# Patient Record
Sex: MALE | Race: WHITE | NOT HISPANIC OR LATINO
[De-identification: names, ages, dates, MRNs, and addresses within clinical notes are randomized per-mention and may not be internally consistent; named-entity substitution may affect disease eponyms.]

---

## 2022-04-20 PROBLEM — Z00.00 ENCOUNTER FOR PREVENTIVE HEALTH EXAMINATION: Noted: 2022-04-20

## 2022-04-22 PROBLEM — Z00.00 ENCOUNTER FOR PREVENTIVE HEALTH EXAMINATION: Status: ACTIVE | Noted: 2022-04-22

## 2022-05-03 ENCOUNTER — APPOINTMENT (OUTPATIENT)
Dept: ORTHOPEDIC SURGERY | Facility: CLINIC | Age: 62
End: 2022-05-03

## 2022-05-19 ENCOUNTER — APPOINTMENT (OUTPATIENT)
Dept: ORTHOPEDIC SURGERY | Facility: CLINIC | Age: 62
End: 2022-05-19
Payer: OTHER MISCELLANEOUS

## 2022-05-19 VITALS — BODY MASS INDEX: 24.38 KG/M2 | WEIGHT: 180 LBS | HEIGHT: 72 IN

## 2022-05-19 DIAGNOSIS — F17.200 NICOTINE DEPENDENCE, UNSPECIFIED, UNCOMPLICATED: ICD-10-CM

## 2022-05-19 DIAGNOSIS — Z78.9 OTHER SPECIFIED HEALTH STATUS: ICD-10-CM

## 2022-05-19 PROCEDURE — 20610 DRAIN/INJ JOINT/BURSA W/O US: CPT

## 2022-05-19 PROCEDURE — 99214 OFFICE O/P EST MOD 30 MIN: CPT | Mod: 25

## 2022-05-19 PROCEDURE — 99072 ADDL SUPL MATRL&STAF TM PHE: CPT

## 2022-05-19 RX ORDER — LOSARTAN POTASSIUM 50 MG/1
50 TABLET, FILM COATED ORAL
Refills: 0 | Status: ACTIVE | COMMUNITY

## 2022-05-19 RX ORDER — ROSUVASTATIN CALCIUM 40 MG/1
40 TABLET, FILM COATED ORAL
Refills: 0 | Status: ACTIVE | COMMUNITY

## 2022-05-19 RX ORDER — PANTOPRAZOLE SODIUM 40 MG/1
40 GRANULE, DELAYED RELEASE ORAL
Refills: 0 | Status: ACTIVE | COMMUNITY

## 2022-05-19 NOTE — HISTORY OF PRESENT ILLNESS
[8] : 8 [6] : 6 [de-identified] : discussed pain knee returned after orthovisc but still some pain medially . is working no falls new injureis.  [FreeTextEntry1] : left knee [de-identified] : none

## 2022-05-19 NOTE — PHYSICAL EXAM
[Left] : left knee [NL (0)] : extension 0 degrees [5___] : hamstring 5[unfilled]/5 [Equivocal] : equivocal Davonte [] : negative Pivot Shift [TWNoteComboBox7] : flexion 125 degrees

## 2022-05-19 NOTE — PROCEDURE
[FreeTextEntry3] : Large Joint Injection was performed because of pain and inflammation. Anesthesia: ethyl chloride sprayed topically.. \par Decadron: An injection of Decadron 4 mg , \par Kenalog: An injection of Kenalog 5 mg , \par Lidocaine: 2 cc. \par \par Medication was injected in the left knee. Patient has tried OTC's including aspirin, Ibuprofen, Aleve etc or prescription NSAIDS, and/or exercises at home and/ or physical therapy without satisfactory response and Patient has decreased mobility in the joint. After verbal consent using sterile preparation and technique. The risks, benefits, and alternatives to cortisone injection were explained in full to the patient. Risks outlined include but are not limited to infection, sepsis, bleeding, scarring, skin discoloration, temporary increase in pain, syncopal episode, failure to resolve symptoms, allergic reaction, symptom recurrence, and elevation of blood sugar in diabetics. Patient understood the risks. All questions were answered. After discussion of options, patient requested an injection. Oral informed consent was obtained and sterile prep was done of the injection site. Sterile technique was utilized for the procedure including the preparation of the solutions used for the injection. Patient tolerated the procedure well. Advised to ice the injection site this evening. Prep with betadine locally to site. Sterile technique used and Prep with alcohol locally to site. Sterile technique used. Patient tolerated procedure well. Post Procedure Instructions: Patient was advised to call if redness, pain, or fever occur and apply ice for 15 min. out of every hour for the next 12-24 hours as tolerated. patient was advised to rest the joint(s) for 1-2 days.\par

## 2022-06-28 ENCOUNTER — APPOINTMENT (OUTPATIENT)
Dept: PAIN MANAGEMENT | Facility: CLINIC | Age: 62
End: 2022-06-28

## 2022-06-28 PROCEDURE — 64483 NJX AA&/STRD TFRM EPI L/S 1: CPT | Mod: LT

## 2022-06-28 PROCEDURE — 99072 ADDL SUPL MATRL&STAF TM PHE: CPT

## 2022-06-28 PROCEDURE — 64484 NJX AA&/STRD TFRM EPI L/S EA: CPT | Mod: 59,LT

## 2022-06-28 NOTE — PROCEDURE
[FreeTextEntry3] : Date of Service: 06/28/2022 \par \par Account: [id number]\par \par Patient: Haresh Quezada \par \par YOB: 1960\par \par Age: 62 year\par \par \par Surgeon: Sharlene Rosen M.D.\par \par Assistant: None.\par \par Pre-Operative Diagnosis: Lumbosacral Radiculitis (M54.17)\par \par Post Operative Diagnosis: Lumbosacral Radiculitis (M54.17)\par \par Procedure: Left L4-5, L5-S1 transforaminal epidural steroid injection under fluoroscopic guidance.        \par \par \par This procedure was carried out using fluoroscopic guidance.  The risks and benefits of the procedure were discussed extensively with the patient.  The consent of the patient was obtained and the following procedure was performed. The patient was placed in the prone position on the fluoroscopic table and the lumbar area was prepped and draped in a sterile fashion.\par \par The left L4-5 and L5-S1 neural foramen were identified on left oblique "elan dog" anatomical view at the 6 o' clock position using fluoroscopic guidance, and the area was marked. The overlying skin and subcutaneous structures were anesthetized using sterile technique with 1% Lidocaine.  A 22 gauge spinal needle was directed toward the inferior (6 o'clock) position of the pedicle, which formed the roof of the identified foramens.  Once in the epidural space, after negative aspiration for heme and CSF, 1cc of Omnipaque contrast was injected to confirm epidural location and assess filling defects and rule out intravascular needle placement. \par \par The following contrast flow and epidurogram was observed: no intravascular or intrathecal flow pattern was noted.  No blood or CSF was aspirated. Omnipaque spread appeared to outline the left L4 and L5 nerve roots and spread medially into the epidural space.  \par \par After this, an injectate of 3 cc preservative free normal saline plus 40 mg of Kenalog was injected in the epidural space at each of the two levels.\par \par The needle was subsequently removed.  Vital signs remained normal.  Pulse oximeter was used throughout the procedure and the patient's pulse and oxygen saturation remained within normal limits.  The patient tolerated the procedure well.  There were no complications.  The patient was instructed to apply ice over the injection sites for twenty minutes every two hours for the next 24 to 48 hours.  The patient was also instructed to contact me immediately if there were any problems.\par \desiree Rosen M.D. \par

## 2022-07-22 ENCOUNTER — APPOINTMENT (OUTPATIENT)
Dept: PAIN MANAGEMENT | Facility: CLINIC | Age: 62
End: 2022-07-22

## 2022-07-22 VITALS — BODY MASS INDEX: 24.38 KG/M2 | HEIGHT: 72 IN | WEIGHT: 180 LBS

## 2022-07-22 DIAGNOSIS — M25.562 PAIN IN LEFT KNEE: ICD-10-CM

## 2022-07-22 PROCEDURE — 99214 OFFICE O/P EST MOD 30 MIN: CPT

## 2022-07-22 PROCEDURE — 99072 ADDL SUPL MATRL&STAF TM PHE: CPT

## 2022-07-22 NOTE — HISTORY OF PRESENT ILLNESS
[Lower back] : lower back [9] : 9 [5] : 5 [Dull/Aching] : dull/aching [Stabbing] : stabbing [] : yes [Intermittent] : intermittent [Household chores] : household chores [Leisure] : leisure [Sleep] : sleep [Social interactions] : social interactions [Ice] : ice [Heat] : heat [Massage] : massage [FreeTextEntry1] : 07/22/2022: follow up today.  Had 40% relief from TFESI.  Still has some pain left hip and into left knee.  Had surgery last May. Pt with what sounds like re irritation of the left L2/3 nerve roots. Had surgery on 5/5/21, no updated imaging after surgery. Pain in the left leg to radiation to the left thigh. with certain movements pain is severe. Pain in the anterior thigh and left calf. +numbness in the left leg. Has NEVER had any acupucnture. \par \par 3/22/22: follow up today. He is seeing Dr. Pate for the left knee pain. (having gel injections) Having pain that\par radiates down the left leg. pain in the left lower back. Has tightness in the left knee. Not interested in restarting\par gabapentin. EMG/NCV: revealed left L5 radiculopathy\par \par 11/9/21- Patient had laminectomy discectomy L2-L3 with Dr. Knight on 5/6/21. Still is doing PT. Pain and stiffness in the left knee, numbness in the left shin. He is not interested in re starting gabapentin. Had EMG/NCV this year. he will fax over the results. working light duty as an .\par \par 3/1/21- Patient reports since last visit his pain has gotten much worse. His pain is now in the entire left leg. This does correlate with his MRI which does reveal nerve impingement of the L3, L4, L5 and S1 nerve roots. I would recommend trial of Pamelor at night. He had about 24 visits over the last year which had improved his ROM and strength. He has not had physical therapy since December and has now had increased pain, now his whole leg. +numbness in the left leg. +weakness. I would recommend he return back to PT to increase ROM, ADLS and decrease pain. He had improvement prior to include improvement of his ROM and pain.\par \par 1/23/21- Patient presents for initial evaluation. He reports increased pain since an injury at work on 1/24/20 while\par working as an . Pain is in the left buttock with radiation down the left leg to the left calf. Intermittent n/t. he reports the legs feel tired. He has been going to physical therapy with improvement of his pain. He is currently resuming PT. Majority left LE pain. (80%) having some increased knee pain.\par \par Subjective weakness: Yes\par Lower extremity paresthesias: Yes\par Bladder/bowel dysfunction:No\par Attempted modalities for current complaint:\par See above:\par Medications: No\par Injections: No\par Previous Spine Surgery: N/A\par Imaging:\par MRI Lumbar Spine (06/02/20) - MRI L spine - 1. Mild dextrolevoscoliosis, diffuse multilevel degenerative disc disease and multiple disc herniations with impingement upon the left L3 nerve root at L2-L3, mild central stenosis and bilateral exiting L3 nerve root encroachment at L3-L4, mild central stenosis with right L5 nerve root impingement as well as right greater than left exiting L4 nerve root impingement at L4-L5 and impingement upon bilateral S1 nerve roots and bilateral exiting L5 nerve roots at L5-S1.\par 2. Scattered degenerative endplate changes particularly in the lower lumbar spine on the right.\par 3. No acute fracture. [FreeTextEntry7] : left hip, left side to the front of the leg and down the side  [FreeTextEntry9] : Tens Unit  [de-identified] : Turning the wrong way, certain positions, squatting and kneeling

## 2022-07-22 NOTE — ASSESSMENT
[FreeTextEntry1] : After discussing various treatment options with the patient including but not limited to oral medications, physical therapy, exercise, modalities as well as interventional spinal injections, we have decided with the following plan:\par \par 1) A MRI is indicated as there has been failure of numerous conservative therapies over the last 6-8 weeks. these include but are not limited to medication therapy and physical therapy. \par \par with contrast to r/o recurrent disc herniation vs scar tissues\par \par 2) I would recommend acupuncture as an adjuvant therapy for Haresh . He has completed other conservative therapies including med management and physical therapy. Goals would be to improve pain, spasm, ROM and overall function. This would be in conjunction to a PT.\par \par \par 3) The patient would benefit from continuation of physical therapy. Short and Long Term goals would be improvement of pain level, improvement of range of motion, improvement of strength and overall improvement of quality of life.  \par \par \par

## 2022-07-22 NOTE — PHYSICAL EXAM
[4___] : left hip flexion 4[unfilled]/5 [] : positive equivocal straight leg raise [TWNoteComboBox7] : forward flexion 75 degrees

## 2022-07-30 ENCOUNTER — APPOINTMENT (OUTPATIENT)
Dept: MRI IMAGING | Facility: CLINIC | Age: 62
End: 2022-07-30

## 2022-08-01 ENCOUNTER — FORM ENCOUNTER (OUTPATIENT)
Age: 62
End: 2022-08-01

## 2022-08-02 ENCOUNTER — APPOINTMENT (OUTPATIENT)
Dept: MRI IMAGING | Facility: CLINIC | Age: 62
End: 2022-08-02

## 2022-08-02 PROCEDURE — 72158 MRI LUMBAR SPINE W/O & W/DYE: CPT

## 2022-08-02 PROCEDURE — 99072 ADDL SUPL MATRL&STAF TM PHE: CPT

## 2022-08-03 RX ORDER — MELOXICAM 15 MG/1
15 TABLET ORAL
Qty: 30 | Refills: 2 | Status: ACTIVE | COMMUNITY
Start: 2022-08-03 | End: 1900-01-01

## 2022-08-12 ENCOUNTER — APPOINTMENT (OUTPATIENT)
Dept: PAIN MANAGEMENT | Facility: CLINIC | Age: 62
End: 2022-08-12

## 2022-08-12 VITALS — BODY MASS INDEX: 24.38 KG/M2 | HEIGHT: 72 IN | WEIGHT: 180 LBS

## 2022-08-12 DIAGNOSIS — M25.552 PAIN IN LEFT HIP: ICD-10-CM

## 2022-08-12 PROCEDURE — 99072 ADDL SUPL MATRL&STAF TM PHE: CPT

## 2022-08-12 PROCEDURE — 99214 OFFICE O/P EST MOD 30 MIN: CPT

## 2022-08-12 PROCEDURE — 73502 X-RAY EXAM HIP UNI 2-3 VIEWS: CPT | Mod: LT

## 2022-08-12 NOTE — PHYSICAL EXAM
[4___] : left hip flexion 4[unfilled]/5 [5___] : adduction 5[unfilled]/5 [Left] : left hip with pelvis [AP] : anteroposterior [Lateral] : lateral [There are no fractures, subluxations or dislocations. No significant abnormalities are seen] : There are no fractures, subluxations or dislocations. No significant abnormalities are seen [TWNoteComboBox7] : forward flexion 75 degrees [] : negative LIMA test [de-identified] : external rotation 40 degrees [TWNoteComboBox6] : internal rotation 10 degrees

## 2022-08-12 NOTE — ASSESSMENT
[FreeTextEntry1] : After discussing various treatment options with the patient including but not limited to oral medications, physical therapy, exercise, modalities as well as interventional spinal injections, we have decided with the following plan:\par \par 1) Intervention Injection Therapy:\par I personally reviewed the MRI/CT scan images and agree with the radiologist's report. The radiological findings were discussed with the patient.\par The risks, benefits, contents and alternatives to injection were explained in full to the patient. Risks outlined include but are not limited to infection,sepsis, bleeding, post-dural puncture headache, nerve damage, temporary increase in pain, syncopal episode, failure to resolve symptoms, allergic reaction, symptom recurrence, and elevation of blood sugar in diabetics. Cortisone may cause immunosuppression. Patient understands the risks. All questions were answered. After discussion of options, patient requested an injection. Information regarding the injection was given to the patient. Which medications to stop prior to the injection was explained to the patient as well.\par \par Follow up in 1-2 weeks post injection for re-evaluation. \par Continue Home exercises, stretching, activity modification, physical therapy, and conservative care.\par Patient is presenting with acute/sub-acute radicular pain with impairment in ADLs and functionality.  The pain has not responded to  conservative care including nsaid therapy and/or physical therapy.  There is no bleeding tendency, unstable medical condition, or systemic infection. \par \par Left L2/3 TFESI--> I would recommend injection prior to repeat surgical evaluation.

## 2022-08-12 NOTE — HISTORY OF PRESENT ILLNESS
[Lower back] : lower back [5] : 5 [Dull/Aching] : dull/aching [Stabbing] : stabbing [] : yes [Household chores] : household chores [Leisure] : leisure [Sleep] : sleep [Social interactions] : social interactions [Ice] : ice [Heat] : heat [Massage] : massage [7] : 7 [Occasional] : occasional [Physical therapy] : physical therapy [Walking] : walking [FreeTextEntry1] : 08/12/2022: follow up today to review MRI 8/2/22: Impression:\par 1. Postoperative changes at L2-L3 where there is decompression posteriorly with left foraminal herniation \par impinging the left exiting L2 nerve root at L2-L3 and focal degenerative endplate changes asymmetric on the \par left at L2-L3 without evidence of discitis or enhancing postoperative fluid collection.\par 2. Dextrolevoscoliosis, multilevel degenerative disc disease, multilevel disc herniations and multilevel central \par stenosis which appears similar to prior exam with improvement regarding central canal narrowing at L2-L3. \par Although there is progression of degenerative endplate changes on the left at L2-L3. There is improvement \par regarding degenerative endplate changes on the right at L5-S1 and L4-L5.\par 3. Clinical correlation regarding prior surgical history is recommended.\par \par pain likely from re irritation of the left L2 nerve root. need to r/o hip pathology. --> hips normal\par His pain directly correlates to his MRI. Has L2 nerve impingement. \par \par 07/22/2022: follow up today.  Had 40% relief from TFESI.  Still has some pain left hip and into left knee.  Had surgery last May. Pt with what sounds like re irritation of the left L2/3 nerve roots. Had surgery on 5/5/21, no updated imaging after surgery. Pain in the left leg to radiation to the left thigh. with certain movements pain is severe. Pain in the anterior thigh and left calf. +numbness in the left leg. Has NEVER had any acupuncture. \par \par 3/22/22: follow up today. He is seeing Dr. Pate for the left knee pain. (having gel injections) Having pain that\par radiates down the left leg. pain in the left lower back. Has tightness in the left knee. Not interested in restarting\par gabapentin. EMG/NCV: revealed left L5 radiculopathy\par \par 11/9/21- Patient had laminectomy discectomy L2-L3 with Dr. Knight on 5/6/21. Still is doing PT. Pain and stiffness in the left knee, numbness in the left shin. He is not interested in re starting gabapentin. Had EMG/NCV this year. he will fax over the results. working light duty as an .\par \par 3/1/21- Patient reports since last visit his pain has gotten much worse. His pain is now in the entire left leg. This does correlate with his MRI which does reveal nerve impingement of the L3, L4, L5 and S1 nerve roots. I would recommend trial of Pamelor at night. He had about 24 visits over the last year which had improved his ROM and strength. He has not had physical therapy since December and has now had increased pain, now his whole leg. +numbness in the left leg. +weakness. I would recommend he return back to PT to increase ROM, ADLS and decrease pain. He had improvement prior to include improvement of his ROM and pain.\par \par 1/23/21- Patient presents for initial evaluation. He reports increased pain since an injury at work on 1/24/20 while\par working as an . Pain is in the left buttock with radiation down the left leg to the left calf. Intermittent n/t. he reports the legs feel tired. He has been going to physical therapy with improvement of his pain. He is currently resuming PT. Majority left LE pain. (80%) having some increased knee pain.\par \par Subjective weakness: Yes\par Lower extremity paresthesias: Yes\par Bladder/bowel dysfunction:No\par Attempted modalities for current complaint:\par See above:\par Medications: No\par Injections: No\par Previous Spine Surgery: N/A\par Imaging:\par MRI Lumbar Spine (06/02/20) - MRI L spine - 1. Mild dextrolevoscoliosis, diffuse multilevel degenerative disc disease and multiple disc herniations with impingement upon the left L3 nerve root at L2-L3, mild central stenosis and bilateral exiting L3 nerve root encroachment at L3-L4, mild central stenosis with right L5 nerve root impingement as well as right greater than left exiting L4 nerve root impingement at L4-L5 and impingement upon bilateral S1 nerve roots and bilateral exiting L5 nerve roots at L5-S1.\par 2. Scattered degenerative endplate changes particularly in the lower lumbar spine on the right.\par 3. No acute fracture. [FreeTextEntry7] : left hip, left side to the front of the leg and down the side  [FreeTextEntry9] : Tens Unit  [de-identified] : Turning the wrong way, certain positions, squatting and kneeling

## 2022-08-19 ENCOUNTER — APPOINTMENT (OUTPATIENT)
Dept: PAIN MANAGEMENT | Facility: CLINIC | Age: 62
End: 2022-08-19

## 2022-08-30 ENCOUNTER — FORM ENCOUNTER (OUTPATIENT)
Age: 62
End: 2022-08-30

## 2022-09-13 ENCOUNTER — APPOINTMENT (OUTPATIENT)
Dept: PAIN MANAGEMENT | Facility: CLINIC | Age: 62
End: 2022-09-13

## 2022-09-13 PROCEDURE — 64483 NJX AA&/STRD TFRM EPI L/S 1: CPT | Mod: LT

## 2022-09-13 PROCEDURE — 99072 ADDL SUPL MATRL&STAF TM PHE: CPT

## 2022-09-13 NOTE — PROCEDURE
[FreeTextEntry3] : Date of Service: 09/13/2022 \par \par Account: [id number]\par \par Patient: Haresh Quezada \par \par YOB: 1960\par \par Age: 62 year\par \par \par Surgeon: Sharlene Rosen M.D.\par \par Assistant: None.\par \par Pre-Operative Diagnosis:  Lumbosacral Radiculitis (M54.17)\par \par Post Operative Diagnosis: Lumbosacral Radiculitis (M54.17)\par \par Procedure: Left L2-3, transforaminal epidural steroid injection under fluoroscopic guidance.\par \par Anesthesia:                           MAC\par \par \par This procedure was carried out using fluoroscopic guidance.  The risks and benefits of the procedure were discussed extensively with the patient.  The consent of the patient was obtained and the following procedure was performed. The patient was placed in the prone position on the fluoroscopic table and the lumbar area was prepped and draped in a sterile fashion.\par \par The left L2-3 neural foramen was identified on left oblique  "elan dog" anatomical view at the 6 o' clock position using fluoroscopic guidance, and the area was marked. The overlying skin and subcutaneous structures were anesthetized using sterile technique with 1% Lidocaine.  A 22 gauge spinal needle was directed toward the inferior (6 o'clock) position of the pedicle, which formed the roof of the identified foramen.  Once in the epidural space, after negative aspiration for heme and CSF, 1cc of Omnipaque contrast was injected to confirm epidural location and assess filling defects and rule out intravascular needle placement. \par \par Lumbar Epidurogram showed no intravascular or intrathecal flow pattern.  No blood or CSF was aspirated. Omnipaque spread medially in epidural space and  outlined the exiting nerve root.  \par \par After this, an injectate of 3 cc preservative free normal saline plus 12mg Celestone was injected in the epidural space.\par \par The needle was subsequently removed.  Vital signs remained normal.  Pulse oximeter was used throughout the procedure and the patient's pulse and oxygen saturation remained within normal limits.  The patient tolerated the procedure well.  There were no complications.  The patient was instructed to apply ice over the injection site for twenty minutes every two hours for the next 24 to 48 hours.  The patient was also instructed to contact me immediately if there were any problems.\par \desiree Rosen M.D.\par \par

## 2022-09-27 ENCOUNTER — APPOINTMENT (OUTPATIENT)
Dept: PAIN MANAGEMENT | Facility: CLINIC | Age: 62
End: 2022-09-27

## 2022-09-27 VITALS — HEIGHT: 70 IN | BODY MASS INDEX: 25.77 KG/M2 | WEIGHT: 180 LBS

## 2022-09-27 PROCEDURE — 99214 OFFICE O/P EST MOD 30 MIN: CPT

## 2022-09-27 PROCEDURE — 99072 ADDL SUPL MATRL&STAF TM PHE: CPT

## 2022-09-27 NOTE — PHYSICAL EXAM
[4___] : left hip flexion 4[unfilled]/5 [5___] : adduction 5[unfilled]/5 [Left] : left hip with pelvis [AP] : anteroposterior [Lateral] : lateral [There are no fractures, subluxations or dislocations. No significant abnormalities are seen] : There are no fractures, subluxations or dislocations. No significant abnormalities are seen [TWNoteComboBox7] : forward flexion 75 degrees [] : negative LIMA test [de-identified] : external rotation 40 degrees [TWNoteComboBox6] : internal rotation 10 degrees

## 2022-09-27 NOTE — ASSESSMENT
[FreeTextEntry1] : After discussing various treatment options with the patient including but not limited to oral medications, physical therapy, exercise, modalities as well as interventional spinal injections, we have decided with the following plan:\par \par 1) Intervention Injection Therapy:\par I personally reviewed the MRI/CT scan images and agree with the radiologist's report. The radiological findings were discussed with the patient.\par The risks, benefits, contents and alternatives to injection were explained in full to the patient. Risks outlined include but are not limited to infection,sepsis, bleeding, post-dural puncture headache, nerve damage, temporary increase in pain, syncopal episode, failure to resolve symptoms, allergic reaction, symptom recurrence, and elevation of blood sugar in diabetics. Cortisone may cause immunosuppression. Patient understands the risks. All questions were answered. After discussion of options, patient requested an injection. Information regarding the injection was given to the patient. Which medications to stop prior to the injection was explained to the patient as well.\par \par Follow up in 1-2 weeks post injection for re-evaluation. \par Continue Home exercises, stretching, activity modification, physical therapy, and conservative care.\par Patient is presenting with acute/sub-acute radicular pain with impairment in ADLs and functionality.  The pain has not responded to  conservative care including nsaid therapy and/or physical therapy.  There is no bleeding tendency, unstable medical condition, or systemic infection. \par \par Right L5/S1 and S1 TFESI

## 2022-09-27 NOTE — HISTORY OF PRESENT ILLNESS
[Lower back] : lower back [7] : 7 [5] : 5 [Dull/Aching] : dull/aching [Stabbing] : stabbing [Sleep] : sleep [Social interactions] : social interactions [Ice] : ice [Heat] : heat [Massage] : massage [Physical therapy] : physical therapy [Walking] : walking [Burning] : burning [Sharp] : sharp [Shooting] : shooting [Tingling] : tingling [Intermittent] : intermittent [Rest] : rest [Meds] : meds [FreeTextEntry1] : 09/27/2022: follow up today for LESI L2-3 on 9/13.  Had some relief on the left lower back to the left anterior thigh.  Now pain is in outside of right calf to the foot.  That is new.  getting numbness and cramping in the right leg.  this correlates with a L5 radiculpathy. \par \par 08/12/2022: follow up today to review MRI 8/2/22: Impression:\par 1. Postoperative changes at L2-L3 where there is decompression posteriorly with left foraminal herniation \par impinging the left exiting L2 nerve root at L2-L3 and focal degenerative endplate changes asymmetric on the \par left at L2-L3 without evidence of discitis or enhancing postoperative fluid collection.\par 2. Dextrolevoscoliosis, multilevel degenerative disc disease, multilevel disc herniations and multilevel central \par stenosis which appears similar to prior exam with improvement regarding central canal narrowing at L2-L3. \par Although there is progression of degenerative endplate changes on the left at L2-L3. There is improvement \par regarding degenerative endplate changes on the right at L5-S1 and L4-L5.\par 3. Clinical correlation regarding prior surgical history is recommended.\par \par pain likely from re irritation of the left L2 nerve root. need to r/o hip pathology. --> hips normal\par His pain directly correlates to his MRI. Has L2 nerve impingement. \par \par 07/22/2022: follow up today.  Had 40% relief from TFESI.  Still has some pain left hip and into left knee.  Had surgery last May. Pt with what sounds like re irritation of the left L2/3 nerve roots. Had surgery on 5/5/21, no updated imaging after surgery. Pain in the left leg to radiation to the left thigh. with certain movements pain is severe. Pain in the anterior thigh and left calf. +numbness in the left leg. Has NEVER had any acupuncture. \par \par 3/22/22: follow up today. He is seeing Dr. Pate for the left knee pain. (having gel injections) Having pain that\par radiates down the left leg. pain in the left lower back. Has tightness in the left knee. Not interested in restarting\par gabapentin. EMG/NCV: revealed left L5 radiculopathy\par \par 11/9/21- Patient had laminectomy discectomy L2-L3 with Dr. Knight on 5/6/21. Still is doing PT. Pain and stiffness in the left knee, numbness in the left shin. He is not interested in re starting gabapentin. Had EMG/NCV this year. he will fax over the results. working light duty as an .\par \par 3/1/21- Patient reports since last visit his pain has gotten much worse. His pain is now in the entire left leg. This does correlate with his MRI which does reveal nerve impingement of the L3, L4, L5 and S1 nerve roots. I would recommend trial of Pamelor at night. He had about 24 visits over the last year which had improved his ROM and strength. He has not had physical therapy since December and has now had increased pain, now his whole leg. +numbness in the left leg. +weakness. I would recommend he return back to PT to increase ROM, ADLS and decrease pain. He had improvement prior to include improvement of his ROM and pain.\par \par 1/23/21- Patient presents for initial evaluation. He reports increased pain since an injury at work on 1/24/20 while\par working as an . Pain is in the left buttock with radiation down the left leg to the left calf. Intermittent n/t. he reports the legs feel tired. He has been going to physical therapy with improvement of his pain. He is currently resuming PT. Majority left LE pain. (80%) having some increased knee pain.\par \par Subjective weakness: Yes\par Lower extremity paresthesias: Yes\par Bladder/bowel dysfunction:No\par Attempted modalities for current complaint:\par See above:\par Medications: No\par Injections: LESI L2-L3 9/13/22\par Previous Spine Surgery: N/A\par Imaging:\par MRI Lumbar Spine (06/02/20) - MRI L spine - 1. Mild dextrolevoscoliosis, diffuse multilevel degenerative disc disease and multiple disc herniations with impingement upon the left L3 nerve root at L2-L3, mild central stenosis and bilateral exiting L3 nerve root encroachment at L3-L4, mild central stenosis with right L5 nerve root impingement as well as right greater than left exiting L4 nerve root impingement at L4-L5 and impingement upon bilateral S1 nerve roots and bilateral exiting L5 nerve roots at L5-S1.\par 2. Scattered degenerative endplate changes particularly in the lower lumbar spine on the right.\par 3. No acute fracture. [] : no [FreeTextEntry6] : numbness  [FreeTextEntry7] : left hip, left side to the front of the leg and down the side  [FreeTextEntry9] : Tens Unit  [de-identified] : Turning the wrong way, certain positions, squatting and kneeling

## 2022-10-04 ENCOUNTER — FORM ENCOUNTER (OUTPATIENT)
Age: 62
End: 2022-10-04

## 2022-10-25 ENCOUNTER — APPOINTMENT (OUTPATIENT)
Dept: PAIN MANAGEMENT | Facility: CLINIC | Age: 62
End: 2022-10-25

## 2022-10-25 VITALS — BODY MASS INDEX: 25.77 KG/M2 | HEIGHT: 70 IN | WEIGHT: 180 LBS

## 2022-10-25 PROCEDURE — 99214 OFFICE O/P EST MOD 30 MIN: CPT

## 2022-10-25 PROCEDURE — 99072 ADDL SUPL MATRL&STAF TM PHE: CPT

## 2022-10-25 NOTE — HISTORY OF PRESENT ILLNESS
[Lower back] : lower back [Burning] : burning [Dull/Aching] : dull/aching [Sharp] : sharp [Shooting] : shooting [Stabbing] : stabbing [Tingling] : tingling [Intermittent] : intermittent [Sleep] : sleep [Social interactions] : social interactions [Rest] : rest [Meds] : meds [Ice] : ice [Heat] : heat [Massage] : massage [Physical therapy] : physical therapy [Walking] : walking [6] : 6 [4] : 4 [FreeTextEntry1] : 10/25/2022: follow up today. MRI reviewed with the patient. He has left lower back pain with intermittent pain in the left leg.  He is seeing Dr. Ollie Knight.  ROSEMARIE's are not effective anymore. Considering multi level fusion. \par \par 09/27/2022: follow up today for LESI L2-3 on 9/13.  Had some relief on the left lower back to the left anterior thigh.  Now pain is in outside of right calf to the foot.  That is new.  getting numbness and cramping in the right leg.  this correlates with a L5 radiculpathy. \par \par 08/12/2022: follow up today to review MRI 8/2/22: Impression:\par 1. Postoperative changes at L2-L3 where there is decompression posteriorly with left foraminal herniation \par impinging the left exiting L2 nerve root at L2-L3 and focal degenerative endplate changes asymmetric on the \par left at L2-L3 without evidence of discitis or enhancing postoperative fluid collection.\par 2. Dextrolevoscoliosis, multilevel degenerative disc disease, multilevel disc herniations and multilevel central \par stenosis which appears similar to prior exam with improvement regarding central canal narrowing at L2-L3. \par Although there is progression of degenerative endplate changes on the left at L2-L3. There is improvement \par regarding degenerative endplate changes on the right at L5-S1 and L4-L5.\par 3. Clinical correlation regarding prior surgical history is recommended.\par \par pain likely from re irritation of the left L2 nerve root. need to r/o hip pathology. --> hips normal\par His pain directly correlates to his MRI. Has L2 nerve impingement. \par \par 07/22/2022: follow up today.  Had 40% relief from TFESI.  Still has some pain left hip and into left knee.  Had surgery last May. Pt with what sounds like re irritation of the left L2/3 nerve roots. Had surgery on 5/5/21, no updated imaging after surgery. Pain in the left leg to radiation to the left thigh. with certain movements pain is severe. Pain in the anterior thigh and left calf. +numbness in the left leg. Has NEVER had any acupuncture. \par \par 3/22/22: follow up today. He is seeing Dr. Garroway for the left knee pain. (having gel injections) Having pain that\par radiates down the left leg. pain in the left lower back. Has tightness in the left knee. Not interested in restarting\par gabapentin. EMG/NCV: revealed left L5 radiculopathy\par \par 11/9/21- Patient had laminectomy discectomy L2-L3 with Dr. Knight on 5/6/21. Still is doing PT. Pain and stiffness in the left knee, numbness in the left shin. He is not interested in re starting gabapentin. Had EMG/NCV this year. he will fax over the results. working light duty as an .\par \par 3/1/21- Patient reports since last visit his pain has gotten much worse. His pain is now in the entire left leg. This does correlate with his MRI which does reveal nerve impingement of the L3, L4, L5 and S1 nerve roots. I would recommend trial of Pamelor at night. He had about 24 visits over the last year which had improved his ROM and strength. He has not had physical therapy since December and has now had increased pain, now his whole leg. +numbness in the left leg. +weakness. I would recommend he return back to PT to increase ROM, ADLS and decrease pain. He had improvement prior to include improvement of his ROM and pain.\par \par 1/23/21- Patient presents for initial evaluation. He reports increased pain since an injury at work on 1/24/20 while\par working as an . Pain is in the left buttock with radiation down the left leg to the left calf. Intermittent n/t. he reports the legs feel tired. He has been going to physical therapy with improvement of his pain. He is currently resuming PT. Majority left LE pain. (80%) having some increased knee pain.\par \par Subjective weakness: Yes\par Lower extremity paresthesias: Yes\par Bladder/bowel dysfunction:No\par Attempted modalities for current complaint:\par See above:\par Medications: No\par Injections: LESI L2-L3 9/13/22\par Previous Spine Surgery: N/A\par Imaging:\par MRI Lumbar Spine (06/02/20) - MRI L spine - 1. Mild dextrolevoscoliosis, diffuse multilevel degenerative disc disease and multiple disc herniations with impingement upon the left L3 nerve root at L2-L3, mild central stenosis and bilateral exiting L3 nerve root encroachment at L3-L4, mild central stenosis with right L5 nerve root impingement as well as right greater than left exiting L4 nerve root impingement at L4-L5 and impingement upon bilateral S1 nerve roots and bilateral exiting L5 nerve roots at L5-S1.\par 2. Scattered degenerative endplate changes particularly in the lower lumbar spine on the right.\par 3. No acute fracture. [] : no [FreeTextEntry6] : numbness  [FreeTextEntry7] : left hip, left side to the front of the leg and down the side  [FreeTextEntry9] : Tens Unit  [de-identified] : Turning the wrong way, certain positions, squatting and kneeling

## 2022-10-25 NOTE — ASSESSMENT
[FreeTextEntry1] : After discussing various treatment options with the patient including but not limited to oral medications, physical therapy, exercise, modalities as well as interventional spinal injections, we have decided with the following plan:\par \par 1) surgical eval\par \par 2) TENS unit (had an old one that no longer works)

## 2022-10-25 NOTE — PHYSICAL EXAM
[4___] : left hip flexion 4[unfilled]/5 [5___] : adduction 5[unfilled]/5 [Left] : left hip with pelvis [AP] : anteroposterior [Lateral] : lateral [There are no fractures, subluxations or dislocations. No significant abnormalities are seen] : There are no fractures, subluxations or dislocations. No significant abnormalities are seen [TWNoteComboBox7] : forward flexion 75 degrees [] : negative LIMA test [de-identified] : external rotation 40 degrees [TWNoteComboBox6] : internal rotation 10 degrees

## 2022-11-10 ENCOUNTER — APPOINTMENT (OUTPATIENT)
Dept: ORTHOPEDIC SURGERY | Facility: CLINIC | Age: 62
End: 2022-11-10

## 2022-11-10 VITALS — WEIGHT: 180 LBS | HEIGHT: 71 IN | BODY MASS INDEX: 25.2 KG/M2

## 2022-11-10 DIAGNOSIS — M54.12 RADICULOPATHY, CERVICAL REGION: ICD-10-CM

## 2022-11-10 DIAGNOSIS — M25.512 PAIN IN RIGHT SHOULDER: ICD-10-CM

## 2022-11-10 DIAGNOSIS — M25.511 PAIN IN RIGHT SHOULDER: ICD-10-CM

## 2022-11-10 DIAGNOSIS — S13.9XXA SPRAIN OF JOINTS AND LIGAMENTS OF UNSPECIFIED PARTS OF NECK, INITIAL ENCOUNTER: ICD-10-CM

## 2022-11-10 PROCEDURE — 99072 ADDL SUPL MATRL&STAF TM PHE: CPT

## 2022-11-10 PROCEDURE — 72040 X-RAY EXAM NECK SPINE 2-3 VW: CPT

## 2022-11-10 PROCEDURE — 99204 OFFICE O/P NEW MOD 45 MIN: CPT

## 2022-11-10 PROCEDURE — 73030 X-RAY EXAM OF SHOULDER: CPT | Mod: RT

## 2022-11-10 RX ORDER — METHYLPREDNISOLONE 4 MG/1
4 TABLET ORAL
Qty: 1 | Refills: 0 | Status: ACTIVE | COMMUNITY
Start: 2022-11-10 | End: 1900-01-01

## 2022-11-10 NOTE — HISTORY OF PRESENT ILLNESS
[Work related] : work related [Sudden] : sudden [9] : 9 [5] : 5 [Intermittent] : intermittent [Nothing helps with pain getting better] : Nothing helps with pain getting better [de-identified] : 11-10-22- He is a right hand dominant male known to work as . While at work on 11/3/22 he was working overhead and the box shifted coming down on him injuring his neck and both shoulders. He has neck and shoulder stiffness. He has tried ice heat and otc nsaids without help. He has continued to work.  [] : no [FreeTextEntry3] : 11/3/2022 [FreeTextEntry5] : Pt was moving things down from a top shelf, the box shifted and Pt caught it on the way down. Pain is localized in his bicep

## 2022-11-10 NOTE — ASSESSMENT
[FreeTextEntry1] : I believe symptoms are mostly cervical in nature will start on therapy and medrol pack f/u 3 weeks, if symptoms persist would consider mri at that time

## 2022-11-10 NOTE — PHYSICAL EXAM
[Flexion] : flexion [Extension] : extension [Rotation to left] : rotation to left [Rotation to right] : rotation to right [Bilateral] : shoulder bilaterally [5 ___] : forward flexion 5[unfilled]/5 [5___] : internal rotation 5[unfilled]/5 [Straightening consistent with spasm] : Straightening consistent with spasm [Disc space narrowing] : Disc space narrowing [Degenerative change] : Degenerative change [] : negative drop-arm test [FreeTextEntry9] : tolerates near full range of motion diffuse pain with rotations

## 2022-11-10 NOTE — WORK
[Sprain/Strain] : sprain/strain [Was the competent medical cause of the injury] : was the competent medical cause of the injury [Are consistent with the injury] : are consistent with the injury [Consistent with my objective findings] : consistent with my objective findings [Does not reveal pre-existing condition(s) that may affect treatment/prognosis] : does not reveal pre-existing condition(s) that may affect treatment/prognosis [Unknown at this time] : : unknown at this time [Patient] : patient [No Rx restrictions] : No Rx restrictions. [I provided the services listed above] :  I provided the services listed above. [Medium Work:] : Medium Work: Exerting 20 to 50 pounds of force occasionally, and/or 10 to 25 pounds of force frequently, and/or greater than negligible up to 10 pounds of force constantly to move objects. Physical demand requirements are in excess of those for Light Work [FreeTextEntry1] : good needs therapy

## 2023-01-09 PROBLEM — M54.16 LUMBAR RADICULOPATHY, CHRONIC: Status: ACTIVE | Noted: 2022-05-19

## 2023-01-10 ENCOUNTER — APPOINTMENT (OUTPATIENT)
Dept: PAIN MANAGEMENT | Facility: CLINIC | Age: 63
End: 2023-01-10
Payer: OTHER MISCELLANEOUS

## 2023-01-10 VITALS — HEIGHT: 71 IN | BODY MASS INDEX: 25.2 KG/M2 | WEIGHT: 180 LBS

## 2023-01-10 DIAGNOSIS — M54.16 RADICULOPATHY, LUMBAR REGION: ICD-10-CM

## 2023-01-10 PROCEDURE — 99072 ADDL SUPL MATRL&STAF TM PHE: CPT

## 2023-01-10 PROCEDURE — 99214 OFFICE O/P EST MOD 30 MIN: CPT

## 2023-01-10 RX ORDER — GABAPENTIN 300 MG/1
300 CAPSULE ORAL
Qty: 270 | Refills: 0 | Status: ACTIVE | COMMUNITY
Start: 2022-06-17 | End: 1900-01-01

## 2023-01-10 NOTE — PHYSICAL EXAM
[5___] : adduction 5[unfilled]/5 [Left] : left hip with pelvis [AP] : anteroposterior [Lateral] : lateral [There are no fractures, subluxations or dislocations. No significant abnormalities are seen] : There are no fractures, subluxations or dislocations. No significant abnormalities are seen [TWNoteComboBox7] : forward flexion 75 degrees [] : negative LIMA test [de-identified] : external rotation 40 degrees [TWNoteComboBox6] : internal rotation 10 degrees

## 2023-01-10 NOTE — ASSESSMENT
[FreeTextEntry1] : After discussing various treatment options with the patient including but not limited to oral medications, physical therapy, exercise, modalities as well as interventional spinal injections, we have decided with the following plan:\par \par 1) I would recommend Continuation of neuropathic medication as patient presents with signs of nerve irritation. (ie burning, paresthesias etc) Goals of therapy would be to improve pain and overall QOL. Side effects reviewed with patient. Patient will call or stop medication if given side effects occur. \par \par 2) Methocarbamol- taking every 6hrs PRN.\par \par 3) on Doxycycline from surgeon\par \par 4) Continue PT

## 2023-01-10 NOTE — HISTORY OF PRESENT ILLNESS
[Lower back] : lower back [6] : 6 [4] : 4 [Burning] : burning [Dull/Aching] : dull/aching [Sharp] : sharp [Shooting] : shooting [Stabbing] : stabbing [Tingling] : tingling [Intermittent] : intermittent [Sleep] : sleep [Social interactions] : social interactions [Rest] : rest [Meds] : meds [Ice] : ice [Heat] : heat [Massage] : massage [Physical therapy] : physical therapy [Walking] : walking [Work related] : work related [de-identified] : pt is following up for back pain \par he states he just had surgery  [] : no [FreeTextEntry3] : 01/24/2020 [FreeTextEntry6] : numbness  [FreeTextEntry7] : left hip, left side to the front of the leg and down the side  [FreeTextEntry9] : Tens Unit  [de-identified] : Turning the wrong way, certain positions, squatting and kneeling [FreeTextEntry1] : 1/10/23: follow up today. Since last visit had surgery with Dr. Knight on 12/1/22. Had revision L2/3 left sided alana laminotomy, discectomy, L4/5 L5/S1 transforaminal interbody fusion with intravertebral cage spacers. the left radicular pain has resolved. Has pain the right toes and calf (right worse than left)  He is currently on gabapentin 300mg Methocarbamol 750mg and doxycycline 100mg. \par \par 10/25/2022: follow up today. MRI reviewed with the patient. He has left lower back pain with intermittent pain in the left leg.  He is seeing Dr. Ollie Knight.  ROSEMARIE's are not effective anymore. Considering multi level fusion. \par \par 09/27/2022: follow up today for LESI L2-3 on 9/13.  Had some relief on the left lower back to the left anterior thigh.  Now pain is in outside of right calf to the foot.  That is new.  getting numbness and cramping in the right leg.  this correlates with a L5 radiculopathy. \par \par 08/12/2022: follow up today to review MRI 8/2/22: Impression:\par 1. Postoperative changes at L2-L3 where there is decompression posteriorly with left foraminal herniation \par impinging the left exiting L2 nerve root at L2-L3 and focal degenerative endplate changes asymmetric on the \par left at L2-L3 without evidence of discitis or enhancing postoperative fluid collection.\par 2. Dextrolevoscoliosis, multilevel degenerative disc disease, multilevel disc herniations and multilevel central \par stenosis which appears similar to prior exam with improvement regarding central canal narrowing at L2-L3. \par Although there is progression of degenerative endplate changes on the left at L2-L3. There is improvement \par regarding degenerative endplate changes on the right at L5-S1 and L4-L5.\par 3. Clinical correlation regarding prior surgical history is recommended.\par \par pain likely from re irritation of the left L2 nerve root. need to r/o hip pathology. --> hips normal\par His pain directly correlates to his MRI. Has L2 nerve impingement. \par \par 07/22/2022: follow up today.  Had 40% relief from TFESI.  Still has some pain left hip and into left knee.  Had surgery last May. Pt with what sounds like re irritation of the left L2/3 nerve roots. Had surgery on 5/5/21, no updated imaging after surgery. Pain in the left leg to radiation to the left thigh. with certain movements pain is severe. Pain in the anterior thigh and left calf. +numbness in the left leg. Has NEVER had any acupuncture. \par \par 3/22/22: follow up today. He is seeing Dr. Pate for the left knee pain. (having gel injections) Having pain that\par radiates down the left leg. pain in the left lower back. Has tightness in the left knee. Not interested in restarting\par gabapentin. EMG/NCV: revealed left L5 radiculopathy\par \par 11/9/21- Patient had laminectomy discectomy L2-L3 with Dr. Knight on 5/6/21. Still is doing PT. Pain and stiffness in the left knee, numbness in the left shin. He is not interested in re starting gabapentin. Had EMG/NCV this year. he will fax over the results. working light duty as an .\par \par 3/1/21- Patient reports since last visit his pain has gotten much worse. His pain is now in the entire left leg. This does correlate with his MRI which does reveal nerve impingement of the L3, L4, L5 and S1 nerve roots. I would recommend trial of Pamelor at night. He had about 24 visits over the last year which had improved his ROM and strength. He has not had physical therapy since December and has now had increased pain, now his whole leg. +numbness in the left leg. +weakness. I would recommend he return back to PT to increase ROM, ADLS and decrease pain. He had improvement prior to include improvement of his ROM and pain.\par \par 1/23/21- Patient presents for initial evaluation. He reports increased pain since an injury at work on 1/24/20 while\par working as an . Pain is in the left buttock with radiation down the left leg to the left calf. Intermittent n/t. he reports the legs feel tired. He has been going to physical therapy with improvement of his pain. He is currently resuming PT. Majority left LE pain. (80%) having some increased knee pain.\par \par Subjective weakness: Yes\par Lower extremity paresthesias: Yes\par Bladder/bowel dysfunction:No\par Attempted modalities for current complaint:\par See above:\par Medications: No\par Injections: LESI L2-L3 9/13/22\par Previous Spine Surgery: N/A\par Imaging:\par MRI Lumbar Spine (06/02/20) - MRI L spine - 1. Mild dextrolevoscoliosis, diffuse multilevel degenerative disc disease and multiple disc herniations with impingement upon the left L3 nerve root at L2-L3, mild central stenosis and bilateral exiting L3 nerve root encroachment at L3-L4, mild central stenosis with right L5 nerve root impingement as well as right greater than left exiting L4 nerve root impingement at L4-L5 and impingement upon bilateral S1 nerve roots and bilateral exiting L5 nerve roots at L5-S1.\par 2. Scattered degenerative endplate changes particularly in the lower lumbar spine on the right.\par 3. No acute fracture.

## 2023-02-15 ENCOUNTER — APPOINTMENT (OUTPATIENT)
Dept: ORTHOPEDIC SURGERY | Facility: CLINIC | Age: 63
End: 2023-02-15
Payer: OTHER MISCELLANEOUS

## 2023-02-15 VITALS — BODY MASS INDEX: 25.2 KG/M2 | WEIGHT: 180 LBS | HEIGHT: 71 IN

## 2023-02-15 PROCEDURE — 99214 OFFICE O/P EST MOD 30 MIN: CPT

## 2023-02-15 PROCEDURE — 99072 ADDL SUPL MATRL&STAF TM PHE: CPT

## 2023-02-15 PROCEDURE — 73564 X-RAY EXAM KNEE 4 OR MORE: CPT | Mod: LT

## 2023-02-15 NOTE — DISCUSSION/SUMMARY
[de-identified] : modify activities\par use elastic sleeve\par try OTC meds\par ice as needed\par he has numbness from above knee to ankle and I believe that this may be related to his lumbar issues\par poss csi, had HA in past\par request comp auth for PT

## 2023-02-15 NOTE — HISTORY OF PRESENT ILLNESS
[Left Leg] : left leg [Work related] : work related [6] : 6 [Dull/Aching] : dull/aching [Work] : work [de-identified] : Patient has persistent symptoms in the left knee/leg. He injured his knee at work in 1/2021, MRI consistent with MCL sprain, no meniscal tear. He has persistent heaviness, distrust of his left knee. He does not feel like he can trust his knee/leg. No significant pain in the knee. He has completed visco in the past, CSI for the knee without much help. Prior history of lumbar surgery. Had been seen by DR Pate [] : no [FreeTextEntry1] : left knee [FreeTextEntry3] : 1/8/21 [FreeTextEntry5] : Patient slipped on oil and twisted the leg  [FreeTextEntry6] : stiffness [de-identified] : 2/16/22 [de-identified] : Dr. Pate

## 2023-02-15 NOTE — WORK
[Total] : total [Does not reveal pre-existing condition(s) that may affect treatment/prognosis] : does not reveal pre-existing condition(s) that may affect treatment/prognosis [Cannot return to work because ________] : cannot return to work because [unfilled] [Patient] : patient [No Rx restrictions] : No Rx restrictions. [I provided the services listed above] :  I provided the services listed above. [FreeTextEntry1] : poor he is out of work due to back surgery unrelated case

## 2023-02-15 NOTE — PHYSICAL EXAM
[5___] : hamstring 5[unfilled]/5 [Left] : left knee [All Views] : anteroposterior, lateral, skyline, and anteroposterior standing [There are no fractures, subluxations or dislocations. No significant abnormalities are seen] : There are no fractures, subluxations or dislocations. No significant abnormalities are seen [Mild tricompartmental OA medial narrowing] : Mild tricompartmental OA medial narrowing [Degenerative change] : Degenerative change [] : non-antalgic [de-identified] : decreased sensation anterior knee.  [TWNoteComboBox7] : flexion 130 degrees [de-identified] : extension 0 degrees

## 2023-02-28 ENCOUNTER — RX RENEWAL (OUTPATIENT)
Age: 63
End: 2023-02-28

## 2023-02-28 RX ORDER — TRAZODONE HYDROCHLORIDE 50 MG/1
50 TABLET ORAL
Qty: 30 | Refills: 0 | Status: ACTIVE | COMMUNITY
Start: 2023-01-20 | End: 1900-01-01

## 2023-05-23 ENCOUNTER — APPOINTMENT (OUTPATIENT)
Dept: ORTHOPEDIC SURGERY | Facility: CLINIC | Age: 63
End: 2023-05-23
Payer: OTHER MISCELLANEOUS

## 2023-05-23 VITALS — HEIGHT: 71 IN | WEIGHT: 180 LBS | BODY MASS INDEX: 25.2 KG/M2

## 2023-05-23 DIAGNOSIS — S83.8X2D SPRAIN OF OTHER SPECIFIED PARTS OF LEFT KNEE, SUBSEQUENT ENCOUNTER: ICD-10-CM

## 2023-05-23 PROCEDURE — 99213 OFFICE O/P EST LOW 20 MIN: CPT

## 2023-05-23 NOTE — DISCUSSION/SUMMARY
[de-identified] : Recommend updated MRI as previous is 2 years old and still having significant knee pain despite conservative management.\par \par PT to focus on strengthening and gait training.\par

## 2023-05-23 NOTE — RETURN TO WORK/SCHOOL
[FreeTextEntry1] : Pt. evaluated today, recommend out of work at this time due to an orthopedic condition. This will be reevaluated at future office visit.

## 2023-05-23 NOTE — HISTORY OF PRESENT ILLNESS
[Dull/Aching] : dull/aching [Work] : work [Left Leg] : left leg [Work related] : work related [6] : 6 [de-identified] : Patient has persistent symptoms in the left knee/leg. He injured his knee at work in 1/2021, previous MRI (June 2021) consistent with MCL sprain, no meniscal tear. He has persistent heaviness, distrust of his left knee. He does not feel like he can trust his knee/leg. Reports knee pain persists. He has completed visco in the past, CSI for the knee without much help. Prior history of lumbar surgery x2 (most recent December 2022).  [] : Post Surgical Visit: no [FreeTextEntry1] : left knee [FreeTextEntry3] : 1/8/21 [FreeTextEntry5] : Patient slipped on oil and twisted the leg  [FreeTextEntry6] : stiffness [de-identified] : 2/16/22 [de-identified] : Dr. Pate [de-identified] : home exercise

## 2023-05-23 NOTE — WORK
[Does not reveal pre-existing condition(s) that may affect treatment/prognosis] : does not reveal pre-existing condition(s) that may affect treatment/prognosis [Cannot return to work because ________] : cannot return to work because [unfilled] [Patient] : patient [No Rx restrictions] : No Rx restrictions. [I provided the services listed above] :  I provided the services listed above. [FreeTextEntry1] : poor he is out of work due to back surgery unrelated case

## 2023-05-23 NOTE — PHYSICAL EXAM
[5___] : hamstring 5[unfilled]/5 [Left] : left knee [All Views] : anteroposterior, lateral, skyline, and anteroposterior standing [There are no fractures, subluxations or dislocations. No significant abnormalities are seen] : There are no fractures, subluxations or dislocations. No significant abnormalities are seen [Degenerative change] : Degenerative change [Mild tricompartmental OA medial narrowing] : Mild tricompartmental OA medial narrowing [] : non-antalgic [de-identified] : decreased sensation anterior knee.  [TWNoteComboBox7] : flexion 130 degrees [de-identified] : extension 0 degrees Ear Star Wedge Flap Text: The defect edges were debeveled with a #15 blade scalpel.  Given the location of the defect and the proximity to free margins (helical rim) an ear star wedge flap was deemed most appropriate.  Using a sterile surgical marker, the appropriate flap was drawn incorporating the defect and placing the expected incisions between the helical rim and antihelix where possible.  The area thus outlined was incised through and through with a #15 scalpel blade.

## 2023-05-30 ENCOUNTER — FORM ENCOUNTER (OUTPATIENT)
Age: 63
End: 2023-05-30

## 2023-05-31 ENCOUNTER — APPOINTMENT (OUTPATIENT)
Dept: MRI IMAGING | Facility: CLINIC | Age: 63
End: 2023-05-31
Payer: OTHER MISCELLANEOUS

## 2023-05-31 PROCEDURE — 73721 MRI JNT OF LWR EXTRE W/O DYE: CPT | Mod: LT

## 2023-06-04 ENCOUNTER — FORM ENCOUNTER (OUTPATIENT)
Age: 63
End: 2023-06-04

## 2023-06-07 ENCOUNTER — APPOINTMENT (OUTPATIENT)
Dept: ORTHOPEDIC SURGERY | Facility: CLINIC | Age: 63
End: 2023-06-07
Payer: OTHER MISCELLANEOUS

## 2023-06-07 PROCEDURE — J3490M: CUSTOM

## 2023-06-07 PROCEDURE — 99214 OFFICE O/P EST MOD 30 MIN: CPT | Mod: 25

## 2023-06-07 PROCEDURE — 76881 US COMPL JOINT R-T W/IMG: CPT | Mod: 59,LT

## 2023-06-07 PROCEDURE — 20611 DRAIN/INJ JOINT/BURSA W/US: CPT | Mod: LT

## 2023-06-07 NOTE — DATA REVIEWED
[MRI] : MRI [Left] : left [Knee] : knee [Report was reviewed and noted in the chart] : The report was reviewed and noted in the chart [I reviewed the films/CD and agree] : I reviewed the films/CD and agree [FreeTextEntry1] : quad strain, chronic mcl sprain and sma pop cyst

## 2023-06-07 NOTE — PHYSICAL EXAM
[Left] : left knee [5___] : hamstring 5[unfilled]/5 [Equivocal] : equivocal Davonte [] : non-antalgic [de-identified] : decreased sensation anterior knee.  [TWNoteComboBox7] : flexion 130 degrees [de-identified] : extension 0 degrees

## 2023-06-07 NOTE — PROCEDURE
[Large Joint Injection] : Large joint injection [Left] : of the left [Knee] : knee [Betadine] : betadine [] : Patient tolerated procedure well [Call if redness, pain or fever occur] : call if redness, pain or fever occur [Apply ice for 15min out of every hour for the next 12-24 hours as tolerated] : apply ice for 15 minutes out of every hour for the next 12-24 hours as tolerated [Patient was advised to rest the joint(s) for ____ days] : patient was advised to rest the joint(s) for [unfilled] days [All ultrasound images have been permanently captured and stored accordingly in our picture archiving and communication system] : All ultrasound images have been permanently captured and stored accordingly in our picture archiving and communication system [Pain] : pain [Inflammation] : inflammation

## 2023-06-07 NOTE — WORK
[Moderate Partial] : moderate partial [Does not reveal pre-existing condition(s) that may affect treatment/prognosis] : does not reveal pre-existing condition(s) that may affect treatment/prognosis [Cannot return to work because ________] : cannot return to work because [unfilled] [Patient] : patient [No Rx restrictions] : No Rx restrictions. [I provided the services listed above] :  I provided the services listed above. [FreeTextEntry1] : poor he is out of work due to back surgery unrelated case

## 2023-06-07 NOTE — HISTORY OF PRESENT ILLNESS
[Work related] : work related [6] : 6 [1] : 2 [Not working due to injury] : Work status: not working due to injury [] : yes [de-identified] : Patient has persistent symptoms in the left knee/leg. He injured his knee at work in 1/2021, previous MRI (June 2021) consistent with MCL sprain, no meniscal tear. He has persistent heaviness, distrust of his left knee. He does not feel like he can trust his knee/leg. Reports knee pain persists. He has completed visco in the past, CSI for the knee without in past  much help. Prior history of lumbar surgery x2 (most recent December 2022).  [FreeTextEntry1] : left knee  [FreeTextEntry3] : 01/08/2021 [de-identified] : started physical therapy a few days ago, tylenol used at night

## 2023-08-09 ENCOUNTER — APPOINTMENT (OUTPATIENT)
Dept: ORTHOPEDIC SURGERY | Facility: CLINIC | Age: 63
End: 2023-08-09
Payer: OTHER MISCELLANEOUS

## 2023-08-09 VITALS — WEIGHT: 180 LBS | HEIGHT: 71 IN | BODY MASS INDEX: 25.2 KG/M2

## 2023-08-09 PROCEDURE — 99213 OFFICE O/P EST LOW 20 MIN: CPT

## 2023-08-09 NOTE — PHYSICAL EXAM
[Left] : left knee [5___] : hamstring 5[unfilled]/5 [Equivocal] : equivocal Davonte [] : patient ambulates without assistive device [de-identified] : decreased sensation anterior knee.  [TWNoteComboBox7] : flexion 130 degrees [de-identified] : extension 0 degrees

## 2023-08-09 NOTE — HISTORY OF PRESENT ILLNESS
[6] : 6 [2] : 2 [Tightness] : tightness [de-identified] : Patient has persistent symptoms in the left leg/knee. Pain and stiffness in the left knee, CSI did not help significantly. He has persistent complaints of pain up and down the left leg. No significant change.  [] : Post Surgical Visit: no [FreeTextEntry1] : left knee [FreeTextEntry5] : Patient is here for left knee follow up. Symptoms continue. Receieved CSI last visit with minimal relief. Has stopped physical therapy, last appointment 7/31/23. [FreeTextEntry6] : numbness [de-identified] : prolonged walking [de-identified] : 7/31/23 [de-identified] : physical therapy, CSI

## 2023-08-09 NOTE — DISCUSSION/SUMMARY
[de-identified] : Let this serve as a formal request for authorization PT program.  Return in 6-8 weeks.

## 2023-11-15 ENCOUNTER — APPOINTMENT (OUTPATIENT)
Dept: ORTHOPEDIC SURGERY | Facility: CLINIC | Age: 63
End: 2023-11-15
Payer: OTHER MISCELLANEOUS

## 2023-11-15 DIAGNOSIS — M17.32 UNILATERAL POST-TRAUMATIC OSTEOARTHRITIS, LEFT KNEE: ICD-10-CM

## 2023-11-15 PROCEDURE — 99213 OFFICE O/P EST LOW 20 MIN: CPT

## 2023-11-15 PROCEDURE — 73564 X-RAY EXAM KNEE 4 OR MORE: CPT | Mod: LT

## 2023-12-08 ENCOUNTER — APPOINTMENT (OUTPATIENT)
Dept: ORTHOPEDIC SURGERY | Facility: CLINIC | Age: 63
End: 2023-12-08
Payer: OTHER MISCELLANEOUS

## 2023-12-08 VITALS — HEIGHT: 71 IN | BODY MASS INDEX: 25.2 KG/M2 | WEIGHT: 180 LBS

## 2023-12-08 DIAGNOSIS — M50.320 OTHER CERVICAL DISC DEGENERATION, MID-CERVICAL REGION, UNSPECIFIED LEVEL: ICD-10-CM

## 2023-12-08 PROCEDURE — 99214 OFFICE O/P EST MOD 30 MIN: CPT | Mod: 25

## 2023-12-08 PROCEDURE — 20610 DRAIN/INJ JOINT/BURSA W/O US: CPT | Mod: RT

## 2023-12-08 RX ORDER — METHYLPREDNISOLONE 4 MG/1
4 TABLET ORAL
Qty: 1 | Refills: 0 | Status: ACTIVE | COMMUNITY
Start: 2023-12-08 | End: 1900-01-01

## 2024-01-08 ENCOUNTER — APPOINTMENT (OUTPATIENT)
Dept: ORTHOPEDIC SURGERY | Facility: CLINIC | Age: 64
End: 2024-01-08
Payer: OTHER MISCELLANEOUS

## 2024-01-08 VITALS — BODY MASS INDEX: 25.2 KG/M2 | WEIGHT: 180 LBS | HEIGHT: 71 IN

## 2024-01-08 PROCEDURE — 99214 OFFICE O/P EST MOD 30 MIN: CPT | Mod: 25

## 2024-01-08 PROCEDURE — 76882 US LMTD JT/FCL EVL NVASC XTR: CPT | Mod: 59

## 2024-01-08 PROCEDURE — 20611 DRAIN/INJ JOINT/BURSA W/US: CPT | Mod: LT

## 2024-01-08 NOTE — DISCUSSION/SUMMARY
[de-identified] : modify activities use elastic sleeve try OTC meds ice as needed try topical lidocaine will see FRANCOIS CHAPMAN

## 2024-01-08 NOTE — WORK
[Moderate Partial] : moderate partial [Does not reveal pre-existing condition(s) that may affect treatment/prognosis] : does not reveal pre-existing condition(s) that may affect treatment/prognosis [Cannot return to work because ________] : cannot return to work because [unfilled] [Patient] : patient [No Rx restrictions] : No Rx restrictions. [I provided the services listed above] :  I provided the services listed above. [FreeTextEntry1] : poor he is out of work due to back surgery

## 2024-01-08 NOTE — HISTORY OF PRESENT ILLNESS
[Gradual] : gradual [6] : 6 [2] : 2 [Tightness] : tightness [Rest] : rest [Exercising] : exercising [Not working due to injury] : Work status: not working due to injury [Frequent] : frequent [Leisure] : leisure [Sleep] : sleep [Ice] : ice [Heat] : heat [de-identified] : Patient has persistent symptoms in the left leg/knee. Pain and stiffness in the left knee, CSI with some help and had HA.He has persistent complaints of pain up and down the left leg. No significant change.  [] : Post Surgical Visit: no [FreeTextEntry1] : left knee [FreeTextEntry5] : Patient is here for left knee follow up. Symptoms continue. Receieved CSI last visit with minimal relief. Has stopped physical therapy, last appointment 7/31/23. [FreeTextEntry6] : numbness [de-identified] : prolonged walking [de-identified] : 11/15/23 [de-identified] : Dr. Arguello [de-identified] : 7/31/23 [de-identified] : physical therapy, CSI

## 2024-01-08 NOTE — PROCEDURE
[Large Joint Injection] : Large joint injection [Left] : of the left [Knee] : knee [Betadine] : betadine [] : Patient tolerated procedure well [Call if redness, pain or fever occur] : call if redness, pain or fever occur [Apply ice for 15min out of every hour for the next 12-24 hours as tolerated] : apply ice for 15 minutes out of every hour for the next 12-24 hours as tolerated [Patient was advised to rest the joint(s) for ____ days] : patient was advised to rest the joint(s) for [unfilled] days [All ultrasound images have been permanently captured and stored accordingly in our picture archiving and communication system] : All ultrasound images have been permanently captured and stored accordingly in our picture archiving and communication system [Pain] : pain [Inflammation] : inflammation [FreeTextEntry3] : Large Joint Injection / Aspiration: Celestone, Lidocaine, Marcaine and Guidance Ultrasound Large Joint Injection was performed because of pain and inflammation. Anesthesia: ethyl chloride sprayed topically..  Celestone: An injection of Celestone 12 mg , 2 cc. Needle size: 22 gauge ,  Lidocaine: 3 cc.  Marcaine: 3 cc.   Medication was injected in the left knee. Patient has tried OTC's including aspirin, Ibuprofen, Aleve etc or prescription NSAIDS, and/or exercises at home and/ or physical therapy without satisfactory response and Patient has decreased mobility in the joint. After verbal consent using sterile preparation and technique. The risks, benefits, and alternatives to cortisone injection were explained in full to the patient. Risks outlined include but are not limited to infection, sepsis, bleeding, scarring, skin discoloration, temporary increase in pain, syncopal episode, failure to resolve symptoms, allergic reaction, symptom recurrence, and elevation of blood sugar in diabetics. Patient understood the risks. All questions were answered. After discussion of options, patient requested an injection. Oral informed consent was obtained and sterile prep was done of the injection site. Sterile technique was utilized for the procedure including the preparation of the solutions used for the injection. Patient tolerated the procedure well. Advised to ice the injection site this evening. Prep with betadine locally to site. Sterile technique used. Patient tolerated procedure well. Post Procedure Instructions: Patient was advised to call if redness, pain, or fever occur and apply ice for 15 min. out of every hour for the next 12-24 hours as tolerated. patient was advised to rest the joint(s) for 2 days.   Ultrasound Guidance was used for the following reasons: altered anatomic landmarks because of erosive arthritis.   Ultrasound guided injection was performed of the knee, visualization of the needle and placement of injection was performed without complication.

## 2024-01-08 NOTE — PHYSICAL EXAM
[Left] : left knee [5___] : hamstring 5[unfilled]/5 [Equivocal] : equivocal Davonte [] : ambulation with cane [de-identified] : decreased sensation anterior knee.  [TWNoteComboBox7] : flexion 130 degrees [de-identified] : extension 0 degrees

## 2024-03-22 ENCOUNTER — APPOINTMENT (OUTPATIENT)
Dept: ORTHOPEDIC SURGERY | Facility: CLINIC | Age: 64
End: 2024-03-22
Payer: OTHER MISCELLANEOUS

## 2024-03-22 VITALS — WEIGHT: 188 LBS | HEIGHT: 70 IN | BODY MASS INDEX: 26.92 KG/M2

## 2024-03-22 DIAGNOSIS — M17.12 UNILATERAL PRIMARY OSTEOARTHRITIS, LEFT KNEE: ICD-10-CM

## 2024-03-22 PROCEDURE — 99214 OFFICE O/P EST MOD 30 MIN: CPT

## 2024-03-22 NOTE — ASSESSMENT
[FreeTextEntry1] : 64M p/w L knee OA  Will plan for repeat gel series Continue HEp COntinue NSAIDs for pain return to begin   The natural progression of Osteoarthritis was explained to the patient. We discussed the possible treatment options from conservative to operative. These included NSAIDS, Glucosamine and Chondrotin sulfate, and Physical Therapy as well different types of injections. We also discussed that at some point they may progress to needed a TKA. Information and pamphlets were given.

## 2024-03-22 NOTE — PHYSICAL EXAM
[] : patella tendon tenderness [Left] : left knee [Mild tricompartmental OA medial narrowing] : Mild tricompartmental OA medial narrowing [TWNoteComboBox7] : flexion 120 degrees [de-identified] : extension 3 degrees

## 2024-03-22 NOTE — HISTORY OF PRESENT ILLNESS
[5] : 5 [Household chores] : household chores [Constant] : constant [Social interactions] : social interactions [de-identified] : 3/22/24: 64M p/w L knee pain, history of gel inj, CSI, PT, NSAIDs, still with pain and stiffness. [] : no [FreeTextEntry1] : LT KNEE  [FreeTextEntry5] : 3/22/24: pt is here today for lt knee pain. pt was told before that he had arthiritis within the knee and hes here today to know what his options are.  [FreeTextEntry6] : ''STIFF''  [de-identified] : certain movements

## 2024-03-22 NOTE — DISCUSSION/SUMMARY
[de-identified] : The patient's current medication management of their orthopedic diagnosis was reviewed today:   (1) We discussed a comprehensive treatment plan that included possible pharmaceutical management involving the use of prescription strength medications including but not limited to options such as oral Naprosyn 500mg BID, once daily Meloxicam 15 mg, or 500-650 mg Tylenol versus over the counter oral medications and topical prescription NSAID Pennsaid vs over the counter Voltaren gel.   (2) There is a moderate risk of morbidity with further treatment, especially from use of prescription strength medications and possible side effects of these medications which include upset stomach with oral medications, skin reactions to topical medications and cardiac/renal issues with long term use.   (3) I recommended that the patient follow-up with their medical physician to discuss any significant specific potential issues with long term medication use such as interactions with current medications or with exacerbation of underlying medical comorbidities.   (4) The benefits and risks associated with use of injectable, oral or topical, prescription and over the counter anti-inflammatory medications were discussed with the patient. The patient voiced understanding of the risks including but not limited to bleeding, stroke, kidney dysfunction, heart disease, and were referred to the black box warning label for further information.  Prior to appointment and during encounter with patient extensive medical records were reviewed including but not limited to, hospital records, out patient records, imaging results, and lab data. During this appointment the patient was examined, diagnoses were discussed and explained in a face to face manner. In addition extensive time was spent reviewing aforementioned diagnostic studies. Counseling including abnormal image results, differential diagnoses, treatment options, risk and benefits, lifestyle changes, current condition, and current medications was performed. Patient's comments, questions, and concerns were address and patient verbalized understanding.

## 2024-04-04 ENCOUNTER — APPOINTMENT (OUTPATIENT)
Dept: PEDIATRIC ALLERGY IMMUNOLOGY | Facility: CLINIC | Age: 64
End: 2024-04-04
Payer: MEDICARE

## 2024-04-04 PROCEDURE — 95117 IMMUNOTHERAPY INJECTIONS: CPT

## 2024-04-04 PROCEDURE — 95165 ANTIGEN THERAPY SERVICES: CPT

## 2024-04-05 ENCOUNTER — NON-APPOINTMENT (OUTPATIENT)
Age: 64
End: 2024-04-05

## 2024-04-05 DIAGNOSIS — E78.00 PURE HYPERCHOLESTEROLEMIA, UNSPECIFIED: ICD-10-CM

## 2024-04-05 DIAGNOSIS — Z87.09 PERSONAL HISTORY OF OTHER DISEASES OF THE RESPIRATORY SYSTEM: ICD-10-CM

## 2024-04-05 DIAGNOSIS — I10 ESSENTIAL (PRIMARY) HYPERTENSION: ICD-10-CM

## 2024-04-05 DIAGNOSIS — J30.89 OTHER ALLERGIC RHINITIS: ICD-10-CM

## 2024-04-05 DIAGNOSIS — J30.1 ALLERGIC RHINITIS DUE TO POLLEN: ICD-10-CM

## 2024-04-05 DIAGNOSIS — J31.1 CHRONIC NASOPHARYNGITIS: ICD-10-CM

## 2024-04-05 DIAGNOSIS — J30.81 ALLERGIC RHINITIS DUE TO ANIMAL (CAT) (DOG) HAIR AND DANDER: ICD-10-CM

## 2024-04-05 DIAGNOSIS — K21.9 GASTRO-ESOPHAGEAL REFLUX DISEASE W/OUT ESOPHAGITIS: ICD-10-CM

## 2024-04-24 ENCOUNTER — APPOINTMENT (OUTPATIENT)
Dept: ORTHOPEDIC SURGERY | Facility: CLINIC | Age: 64
End: 2024-04-24
Payer: OTHER MISCELLANEOUS

## 2024-04-24 VITALS — BODY MASS INDEX: 26.92 KG/M2 | WEIGHT: 188 LBS | HEIGHT: 70 IN

## 2024-04-24 PROCEDURE — 73030 X-RAY EXAM OF SHOULDER: CPT | Mod: RT

## 2024-04-24 PROCEDURE — 73010 X-RAY EXAM OF SHOULDER BLADE: CPT | Mod: RT

## 2024-04-24 PROCEDURE — 72040 X-RAY EXAM NECK SPINE 2-3 VW: CPT

## 2024-04-24 PROCEDURE — 99245 OFF/OP CONSLTJ NEW/EST HI 55: CPT

## 2024-04-24 NOTE — WORK
[Was the competent medical cause of the injury] : was the competent medical cause of the injury [Are consistent with the injury] : are consistent with the injury [Total (100%)] : total (100%) [Cannot return to work because ________] : cannot return to work because [unfilled] [No Rx restrictions] : No Rx restrictions. [I provided the services listed above] :  I provided the services listed above.

## 2024-04-24 NOTE — HISTORY OF PRESENT ILLNESS
[Work related] : work related [Radiating] : radiating [] : yes [de-identified] :  DOI 11/3/2022   4/24/24: 65 yo RHD m with right shoulder pain after lifting heavy boxes and the boxes then fell into his shoulder.  He reports prior right shoulder RTC surgery 1995. Pain is to the anterior shoulder and worse with activity.  Difficulty with OH motions. There is night pain. At times tingling into the fingertips. He saw Dr Wetzel and attended PT with some relief.  He has been taking tylenol.   Pmhx: Lumbar fusion 12/2022 , HTN, Hyperlipidemia, GERD [FreeTextEntry1] : Right shoulder  [FreeTextEntry3] : 11/3/2022 [FreeTextEntry5] : Pt was injured at work, states he has pain from his neck radiating down his right arm. feels numbness and tingling on both hands  [FreeTextEntry7] : Left hand  [de-identified] : Movement  [de-identified] :

## 2024-04-24 NOTE — PHYSICAL EXAM
[Right] : right shoulder [5 ___] : forward flexion 5[unfilled]/5 [5___] : external rotation 5[unfilled]/5 [] : healed incision [FreeTextEntry9] : R  ER 20 A IR sacrum

## 2024-04-24 NOTE — ASSESSMENT
[FreeTextEntry1] : R BONG BOSED.  H/o RCR.   Xrays and advanced imaging reviewed. Discussed op versus non op tx, including the r/b/a/c of both. Discussed rehab and recovery after RSA. He had a CSI with Dr. Wetzel.  Discussed trial of visco supplementation. Will submit a request for orthovisc injection series. RTO for orthovisc injections He Is OOW on Disability.

## 2024-04-24 NOTE — IMAGING
[Disc space narrowing] : Disc space narrowing [Right] : right shoulder [Degenerative change] : Degenerative change [FreeTextEntry1] : +GH degenerative changes s/p DCR

## 2024-05-07 ENCOUNTER — APPOINTMENT (OUTPATIENT)
Dept: PEDIATRIC ALLERGY IMMUNOLOGY | Facility: CLINIC | Age: 64
End: 2024-05-07
Payer: COMMERCIAL

## 2024-05-07 PROCEDURE — 95165 ANTIGEN THERAPY SERVICES: CPT

## 2024-05-07 PROCEDURE — 95117 IMMUNOTHERAPY INJECTIONS: CPT

## 2024-05-15 ENCOUNTER — APPOINTMENT (OUTPATIENT)
Dept: ORTHOPEDIC SURGERY | Facility: CLINIC | Age: 64
End: 2024-05-15
Payer: OTHER MISCELLANEOUS

## 2024-05-15 DIAGNOSIS — M19.011 PRIMARY OSTEOARTHRITIS, RIGHT SHOULDER: ICD-10-CM

## 2024-05-15 PROCEDURE — 99213 OFFICE O/P EST LOW 20 MIN: CPT

## 2024-05-15 NOTE — HISTORY OF PRESENT ILLNESS
[Work related] : work related [Radiating] : radiating [] : yes [de-identified] : WC DOI 11/3/2022   05/15/2024 TRE 64 year M is here today to follow up on right shoulder. Patient states gel injection was not approved and will like to submit another request today.  4/24/24: 63 yo RHD m with right shoulder pain after lifting heavy boxes and the boxes then fell into his shoulder.  He reports prior right shoulder RTC surgery 1995. Pain is to the anterior shoulder and worse with activity.  Difficulty with OH motions. There is night pain. At times tingling into the fingertips. He saw Dr Wetzel and attended PT with some relief.  He has been taking tylenol.   Pmhx: Lumbar fusion 12/2022 , HTN, Hyperlipidemia, GERD [FreeTextEntry1] : Right shoulder  [FreeTextEntry3] : 11/3/2022 [FreeTextEntry5] : Pt was injured at work, states he has pain from his neck radiating down his right arm. feels numbness and tingling on both hands  [FreeTextEntry7] : Left hand  [de-identified] : Movement  [de-identified] :

## 2024-05-15 NOTE — ASSESSMENT
[FreeTextEntry1] : R BONG PAREDES.  H/o RCR.   Xrays and advanced imaging reviewed. Discussed op versus non op tx, including the r/b/a/c of both. Discussed rehab and recovery after RSA. He had a CSI with Dr. Wetzel.  Discussed trial of visco supplementation. He Is OOW on Disability.  Will re submit a request for orthovisc injection series.  Also discussed possible appario in future but will re submit into insurance.  RTO for orthovisc injections

## 2024-05-15 NOTE — PHYSICAL EXAM
[Right] : right shoulder [5 ___] : forward flexion 5[unfilled]/5 [5___] : external rotation 5[unfilled]/5 [] : motor and sensory intact distally [FreeTextEntry9] : R  ER 20 A IR sacrum

## 2024-06-05 ENCOUNTER — NON-APPOINTMENT (OUTPATIENT)
Age: 64
End: 2024-06-05

## 2024-06-05 RX ORDER — CLARITHROMYCIN 500 MG/1
500 TABLET, FILM COATED ORAL TWICE DAILY
Refills: 0 | Status: ACTIVE | COMMUNITY

## 2024-07-08 ENCOUNTER — APPOINTMENT (OUTPATIENT)
Dept: PEDIATRIC ALLERGY IMMUNOLOGY | Facility: CLINIC | Age: 64
End: 2024-07-08
Payer: MEDICARE

## 2024-07-08 DIAGNOSIS — J30.1 ALLERGIC RHINITIS DUE TO POLLEN: ICD-10-CM

## 2024-07-08 DIAGNOSIS — J30.81 ALLERGIC RHINITIS DUE TO ANIMAL (CAT) (DOG) HAIR AND DANDER: ICD-10-CM

## 2024-07-08 PROCEDURE — 95165 ANTIGEN THERAPY SERVICES: CPT

## 2024-07-08 PROCEDURE — 95117 IMMUNOTHERAPY INJECTIONS: CPT

## 2024-07-10 ENCOUNTER — APPOINTMENT (OUTPATIENT)
Dept: ORTHOPEDIC SURGERY | Facility: CLINIC | Age: 64
End: 2024-07-10
Payer: OTHER MISCELLANEOUS

## 2024-07-10 DIAGNOSIS — M17.12 UNILATERAL PRIMARY OSTEOARTHRITIS, LEFT KNEE: ICD-10-CM

## 2024-07-10 PROCEDURE — 99214 OFFICE O/P EST MOD 30 MIN: CPT

## 2024-07-12 ENCOUNTER — APPOINTMENT (OUTPATIENT)
Dept: ORTHOPEDIC SURGERY | Facility: CLINIC | Age: 64
End: 2024-07-12
Payer: OTHER MISCELLANEOUS

## 2024-07-12 VITALS — BODY MASS INDEX: 26.92 KG/M2 | WEIGHT: 188 LBS | HEIGHT: 70 IN

## 2024-07-12 DIAGNOSIS — M19.011 PRIMARY OSTEOARTHRITIS, RIGHT SHOULDER: ICD-10-CM

## 2024-07-12 PROCEDURE — 20611 DRAIN/INJ JOINT/BURSA W/US: CPT | Mod: RT

## 2024-07-12 PROCEDURE — 99214 OFFICE O/P EST MOD 30 MIN: CPT | Mod: 25

## 2024-07-12 PROCEDURE — J3490M: CUSTOM

## 2024-08-05 ENCOUNTER — APPOINTMENT (OUTPATIENT)
Dept: PEDIATRIC ALLERGY IMMUNOLOGY | Facility: CLINIC | Age: 64
End: 2024-08-05

## 2024-08-05 PROCEDURE — 95165 ANTIGEN THERAPY SERVICES: CPT

## 2024-08-05 PROCEDURE — 95117 IMMUNOTHERAPY INJECTIONS: CPT

## 2024-08-14 ENCOUNTER — APPOINTMENT (OUTPATIENT)
Dept: PAIN MANAGEMENT | Facility: CLINIC | Age: 64
End: 2024-08-14

## 2024-08-14 ENCOUNTER — APPOINTMENT (OUTPATIENT)
Dept: ORTHOPEDIC SURGERY | Facility: CLINIC | Age: 64
End: 2024-08-14

## 2024-08-15 NOTE — PHYSICAL EXAM
[5___] : adduction 5[unfilled]/5 [Left] : left hip with pelvis [AP] : anteroposterior [Lateral] : lateral [There are no fractures, subluxations or dislocations. No significant abnormalities are seen] : There are no fractures, subluxations or dislocations. No significant abnormalities are seen [TWNoteComboBox7] : forward flexion 75 degrees [] : negative LIMA test [de-identified] : external rotation 40 degrees [TWNoteComboBox6] : internal rotation 10 degrees

## 2024-08-15 NOTE — HISTORY OF PRESENT ILLNESS
[Lower back] : lower back [Work related] : work related [6] : 6 [4] : 4 [Burning] : burning [Dull/Aching] : dull/aching [Sharp] : sharp [Shooting] : shooting [Stabbing] : stabbing [Tingling] : tingling [Intermittent] : intermittent [Sleep] : sleep [Social interactions] : social interactions [Rest] : rest [Meds] : meds [Ice] : ice [Heat] : heat [Massage] : massage [Physical therapy] : physical therapy [Walking] : walking [de-identified] : pt is following up for back pain \par  he states he just had surgery  [FreeTextEntry1] : 08/14/2024: follow up today   1/10/23: follow up today. Since last visit had surgery with Dr. Knight on 12/1/22. Had revision L2/3 left sided alana laminotomy, discectomy, L4/5 L5/S1 transforaminal interbody fusion with intravertebral cage spacers. the left radicular pain has resolved. Has pain the right toes and calf (right worse than left)  He is currently on gabapentin 300mg Methocarbamol 750mg and doxycycline 100mg.   10/25/2022: follow up today. MRI reviewed with the patient. He has left lower back pain with intermittent pain in the left leg.  He is seeing Dr. Ollie nKight.  ROSEMARIE's are not effective anymore. Considering multi level fusion.   09/27/2022: follow up today for LESI L2-3 on 9/13.  Had some relief on the left lower back to the left anterior thigh.  Now pain is in outside of right calf to the foot.  That is new.  getting numbness and cramping in the right leg.  this correlates with a L5 radiculopathy.   08/12/2022: follow up today to review MRI 8/2/22: Impression: 1. Postoperative changes at L2-L3 where there is decompression posteriorly with left foraminal herniation  impinging the left exiting L2 nerve root at L2-L3 and focal degenerative endplate changes asymmetric on the  left at L2-L3 without evidence of discitis or enhancing postoperative fluid collection. 2. Dextrolevoscoliosis, multilevel degenerative disc disease, multilevel disc herniations and multilevel central  stenosis which appears similar to prior exam with improvement regarding central canal narrowing at L2-L3.  Although there is progression of degenerative endplate changes on the left at L2-L3. There is improvement  regarding degenerative endplate changes on the right at L5-S1 and L4-L5. 3. Clinical correlation regarding prior surgical history is recommended.  pain likely from re irritation of the left L2 nerve root. need to r/o hip pathology. --> hips normal His pain directly correlates to his MRI. Has L2 nerve impingement.   07/22/2022: follow up today.  Had 40% relief from TFESI.  Still has some pain left hip and into left knee.  Had surgery last May. Pt with what sounds like re irritation of the left L2/3 nerve roots. Had surgery on 5/5/21, no updated imaging after surgery. Pain in the left leg to radiation to the left thigh. with certain movements pain is severe. Pain in the anterior thigh and left calf. +numbness in the left leg. Has NEVER had any acupuncture.   3/22/22: follow up today. He is seeing Dr. Pate for the left knee pain. (having gel injections) Having pain that radiates down the left leg. pain in the left lower back. Has tightness in the left knee. Not interested in restarting gabapentin. EMG/NCV: revealed left L5 radiculopathy  11/9/21- Patient had laminectomy discectomy L2-L3 with Dr. Knight on 5/6/21. Still is doing PT. Pain and stiffness in the left knee, numbness in the left shin. He is not interested in re starting gabapentin. Had EMG/NCV this year. he will fax over the results. working light duty as an .  3/1/21- Patient reports since last visit his pain has gotten much worse. His pain is now in the entire left leg. This does correlate with his MRI which does reveal nerve impingement of the L3, L4, L5 and S1 nerve roots. I would recommend trial of Pamelor at night. He had about 24 visits over the last year which had improved his ROM and strength. He has not had physical therapy since December and has now had increased pain, now his whole leg. +numbness in the left leg. +weakness. I would recommend he return back to PT to increase ROM, ADLS and decrease pain. He had improvement prior to include improvement of his ROM and pain.  1/23/21- Patient presents for initial evaluation. He reports increased pain since an injury at work on 1/24/20 while working as an . Pain is in the left buttock with radiation down the left leg to the left calf. Intermittent n/t. he reports the legs feel tired. He has been going to physical therapy with improvement of his pain. He is currently resuming PT. Majority left LE pain. (80%) having some increased knee pain.  Subjective weakness: Yes Lower extremity paresthesias: Yes Bladder/bowel dysfunction:No Attempted modalities for current complaint: See above: Medications: No Injections: LESI L2-L3 9/13/22 Previous Spine Surgery: N/A Imaging: MRI Lumbar Spine (06/02/20) - MRI L spine - 1. Mild dextrolevoscoliosis, diffuse multilevel degenerative disc disease and multiple disc herniations with impingement upon the left L3 nerve root at L2-L3, mild central stenosis and bilateral exiting L3 nerve root encroachment at L3-L4, mild central stenosis with right L5 nerve root impingement as well as right greater than left exiting L4 nerve root impingement at L4-L5 and impingement upon bilateral S1 nerve roots and bilateral exiting L5 nerve roots at L5-S1. 2. Scattered degenerative endplate changes particularly in the lower lumbar spine on the right. 3. No acute fracture. [] : no [FreeTextEntry3] : 01/24/2020 [FreeTextEntry6] : numbness  [FreeTextEntry7] : left hip, left side to the front of the leg and down the side  [FreeTextEntry9] : Tens Unit  [de-identified] : Turning the wrong way, certain positions, squatting and kneeling

## 2024-09-03 ENCOUNTER — APPOINTMENT (OUTPATIENT)
Dept: PEDIATRIC ALLERGY IMMUNOLOGY | Facility: CLINIC | Age: 64
End: 2024-09-03
Payer: MEDICARE

## 2024-09-03 DIAGNOSIS — J30.89 OTHER ALLERGIC RHINITIS: ICD-10-CM

## 2024-09-03 PROCEDURE — 95165 ANTIGEN THERAPY SERVICES: CPT

## 2024-09-03 PROCEDURE — 95117 IMMUNOTHERAPY INJECTIONS: CPT

## 2024-10-07 ENCOUNTER — APPOINTMENT (OUTPATIENT)
Dept: PEDIATRIC ALLERGY IMMUNOLOGY | Facility: CLINIC | Age: 64
End: 2024-10-07
Payer: MEDICARE

## 2024-10-07 DIAGNOSIS — J30.89 OTHER ALLERGIC RHINITIS: ICD-10-CM

## 2024-10-07 PROCEDURE — 95165 ANTIGEN THERAPY SERVICES: CPT

## 2024-10-07 PROCEDURE — 95117 IMMUNOTHERAPY INJECTIONS: CPT

## 2024-11-08 ENCOUNTER — APPOINTMENT (OUTPATIENT)
Dept: PEDIATRIC ALLERGY IMMUNOLOGY | Facility: CLINIC | Age: 64
End: 2024-11-08
Payer: MEDICARE

## 2024-11-08 DIAGNOSIS — J30.89 OTHER ALLERGIC RHINITIS: ICD-10-CM

## 2024-11-08 PROCEDURE — 95117 IMMUNOTHERAPY INJECTIONS: CPT

## 2024-11-08 PROCEDURE — 95165 ANTIGEN THERAPY SERVICES: CPT

## 2024-12-03 ENCOUNTER — APPOINTMENT (OUTPATIENT)
Dept: PEDIATRIC ALLERGY IMMUNOLOGY | Facility: CLINIC | Age: 64
End: 2024-12-03
Payer: MEDICARE

## 2024-12-03 DIAGNOSIS — J30.81 ALLERGIC RHINITIS DUE TO ANIMAL (CAT) (DOG) HAIR AND DANDER: ICD-10-CM

## 2024-12-03 PROCEDURE — 95117 IMMUNOTHERAPY INJECTIONS: CPT

## 2024-12-03 PROCEDURE — 95165 ANTIGEN THERAPY SERVICES: CPT

## 2025-01-06 ENCOUNTER — APPOINTMENT (OUTPATIENT)
Dept: PEDIATRIC ALLERGY IMMUNOLOGY | Facility: CLINIC | Age: 65
End: 2025-01-06
Payer: MEDICARE

## 2025-01-06 DIAGNOSIS — J30.89 OTHER ALLERGIC RHINITIS: ICD-10-CM

## 2025-01-06 PROCEDURE — 95165 ANTIGEN THERAPY SERVICES: CPT

## 2025-01-06 PROCEDURE — 95117 IMMUNOTHERAPY INJECTIONS: CPT

## 2025-01-22 ENCOUNTER — APPOINTMENT (OUTPATIENT)
Dept: ORTHOPEDIC SURGERY | Facility: CLINIC | Age: 65
End: 2025-01-22
Payer: OTHER MISCELLANEOUS

## 2025-01-22 DIAGNOSIS — M19.011 PRIMARY OSTEOARTHRITIS, RIGHT SHOULDER: ICD-10-CM

## 2025-01-22 PROCEDURE — 99214 OFFICE O/P EST MOD 30 MIN: CPT

## 2025-01-24 ENCOUNTER — APPOINTMENT (OUTPATIENT)
Dept: ORTHOPEDIC SURGERY | Facility: CLINIC | Age: 65
End: 2025-01-24
Payer: OTHER MISCELLANEOUS

## 2025-01-24 DIAGNOSIS — M17.12 UNILATERAL PRIMARY OSTEOARTHRITIS, LEFT KNEE: ICD-10-CM

## 2025-01-24 PROCEDURE — 99214 OFFICE O/P EST MOD 30 MIN: CPT

## 2025-01-27 ENCOUNTER — APPOINTMENT (OUTPATIENT)
Dept: MRI IMAGING | Facility: CLINIC | Age: 65
End: 2025-01-27

## 2025-01-27 PROCEDURE — 73721 MRI JNT OF LWR EXTRE W/O DYE: CPT | Mod: LT

## 2025-02-05 ENCOUNTER — APPOINTMENT (OUTPATIENT)
Dept: PEDIATRIC ALLERGY IMMUNOLOGY | Facility: CLINIC | Age: 65
End: 2025-02-05
Payer: MEDICARE

## 2025-02-05 DIAGNOSIS — J30.89 OTHER ALLERGIC RHINITIS: ICD-10-CM

## 2025-02-05 PROCEDURE — 95117 IMMUNOTHERAPY INJECTIONS: CPT

## 2025-02-05 PROCEDURE — 95165 ANTIGEN THERAPY SERVICES: CPT

## 2025-02-28 ENCOUNTER — APPOINTMENT (OUTPATIENT)
Dept: ORTHOPEDIC SURGERY | Facility: CLINIC | Age: 65
End: 2025-02-28
Payer: OTHER MISCELLANEOUS

## 2025-02-28 VITALS — BODY MASS INDEX: 26.92 KG/M2 | WEIGHT: 188 LBS | HEIGHT: 70 IN

## 2025-02-28 PROCEDURE — 20611 DRAIN/INJ JOINT/BURSA W/US: CPT | Mod: RT

## 2025-02-28 PROCEDURE — 99213 OFFICE O/P EST LOW 20 MIN: CPT | Mod: 25

## 2025-03-04 ENCOUNTER — APPOINTMENT (OUTPATIENT)
Dept: PEDIATRIC ALLERGY IMMUNOLOGY | Facility: CLINIC | Age: 65
End: 2025-03-04
Payer: MEDICARE

## 2025-03-04 DIAGNOSIS — J30.1 ALLERGIC RHINITIS DUE TO POLLEN: ICD-10-CM

## 2025-03-04 PROCEDURE — 95117 IMMUNOTHERAPY INJECTIONS: CPT

## 2025-03-04 PROCEDURE — 95165 ANTIGEN THERAPY SERVICES: CPT

## 2025-03-07 ENCOUNTER — APPOINTMENT (OUTPATIENT)
Dept: ORTHOPEDIC SURGERY | Facility: CLINIC | Age: 65
End: 2025-03-07
Payer: OTHER MISCELLANEOUS

## 2025-03-07 VITALS — WEIGHT: 188 LBS | BODY MASS INDEX: 26.92 KG/M2 | HEIGHT: 70 IN

## 2025-03-07 DIAGNOSIS — M19.011 PRIMARY OSTEOARTHRITIS, RIGHT SHOULDER: ICD-10-CM

## 2025-03-07 PROCEDURE — 20611 DRAIN/INJ JOINT/BURSA W/US: CPT | Mod: RT

## 2025-03-07 PROCEDURE — 99213 OFFICE O/P EST LOW 20 MIN: CPT | Mod: 25

## 2025-03-19 ENCOUNTER — APPOINTMENT (OUTPATIENT)
Dept: ORTHOPEDIC SURGERY | Facility: CLINIC | Age: 65
End: 2025-03-19
Payer: OTHER MISCELLANEOUS

## 2025-03-19 DIAGNOSIS — M19.011 PRIMARY OSTEOARTHRITIS, RIGHT SHOULDER: ICD-10-CM

## 2025-03-19 PROCEDURE — 20611 DRAIN/INJ JOINT/BURSA W/US: CPT | Mod: RT

## 2025-04-02 ENCOUNTER — APPOINTMENT (OUTPATIENT)
Dept: PEDIATRIC ALLERGY IMMUNOLOGY | Facility: CLINIC | Age: 65
End: 2025-04-02
Payer: MEDICARE

## 2025-04-02 DIAGNOSIS — J30.1 ALLERGIC RHINITIS DUE TO POLLEN: ICD-10-CM

## 2025-04-02 PROCEDURE — 95117 IMMUNOTHERAPY INJECTIONS: CPT

## 2025-04-02 PROCEDURE — 95165 ANTIGEN THERAPY SERVICES: CPT

## 2025-05-08 ENCOUNTER — APPOINTMENT (OUTPATIENT)
Dept: PEDIATRIC ALLERGY IMMUNOLOGY | Facility: CLINIC | Age: 65
End: 2025-05-08
Payer: MEDICARE

## 2025-05-08 DIAGNOSIS — J30.89 OTHER ALLERGIC RHINITIS: ICD-10-CM

## 2025-05-08 PROCEDURE — 95117 IMMUNOTHERAPY INJECTIONS: CPT

## 2025-05-08 PROCEDURE — 95165 ANTIGEN THERAPY SERVICES: CPT

## 2025-05-30 ENCOUNTER — APPOINTMENT (OUTPATIENT)
Dept: ORTHOPEDIC SURGERY | Facility: CLINIC | Age: 65
End: 2025-05-30
Payer: OTHER MISCELLANEOUS

## 2025-05-30 DIAGNOSIS — M17.32 UNILATERAL POST-TRAUMATIC OSTEOARTHRITIS, LEFT KNEE: ICD-10-CM

## 2025-05-30 PROCEDURE — 99214 OFFICE O/P EST MOD 30 MIN: CPT

## 2025-05-30 PROCEDURE — 73562 X-RAY EXAM OF KNEE 3: CPT | Mod: LT

## 2025-06-09 ENCOUNTER — APPOINTMENT (OUTPATIENT)
Dept: PEDIATRIC ALLERGY IMMUNOLOGY | Facility: CLINIC | Age: 65
End: 2025-06-09
Payer: MEDICARE

## 2025-06-09 PROCEDURE — 95165 ANTIGEN THERAPY SERVICES: CPT

## 2025-06-09 PROCEDURE — 95117 IMMUNOTHERAPY INJECTIONS: CPT

## 2025-06-18 ENCOUNTER — APPOINTMENT (OUTPATIENT)
Dept: PAIN MANAGEMENT | Facility: CLINIC | Age: 65
End: 2025-06-18
Payer: OTHER MISCELLANEOUS

## 2025-06-18 VITALS — HEIGHT: 61 IN | BODY MASS INDEX: 36.44 KG/M2 | WEIGHT: 193 LBS

## 2025-06-18 PROCEDURE — 99214 OFFICE O/P EST MOD 30 MIN: CPT

## 2025-07-09 ENCOUNTER — APPOINTMENT (OUTPATIENT)
Dept: PEDIATRIC ALLERGY IMMUNOLOGY | Facility: CLINIC | Age: 65
End: 2025-07-09
Payer: MEDICARE

## 2025-07-09 PROCEDURE — 95117 IMMUNOTHERAPY INJECTIONS: CPT

## 2025-07-09 PROCEDURE — 95165 ANTIGEN THERAPY SERVICES: CPT

## 2025-07-31 ENCOUNTER — APPOINTMENT (OUTPATIENT)
Dept: PAIN MANAGEMENT | Facility: CLINIC | Age: 65
End: 2025-07-31
Payer: OTHER MISCELLANEOUS

## 2025-07-31 VITALS — HEIGHT: 61 IN | WEIGHT: 193 LBS | BODY MASS INDEX: 36.44 KG/M2

## 2025-07-31 DIAGNOSIS — M54.16 RADICULOPATHY, LUMBAR REGION: ICD-10-CM

## 2025-07-31 PROCEDURE — 99214 OFFICE O/P EST MOD 30 MIN: CPT

## 2025-08-07 ENCOUNTER — APPOINTMENT (OUTPATIENT)
Dept: PEDIATRIC ALLERGY IMMUNOLOGY | Facility: CLINIC | Age: 65
End: 2025-08-07
Payer: MEDICARE

## 2025-08-07 DIAGNOSIS — J30.89 OTHER ALLERGIC RHINITIS: ICD-10-CM

## 2025-08-07 PROCEDURE — 95117 IMMUNOTHERAPY INJECTIONS: CPT

## 2025-08-07 PROCEDURE — 95165 ANTIGEN THERAPY SERVICES: CPT

## 2025-09-08 ENCOUNTER — APPOINTMENT (OUTPATIENT)
Dept: PEDIATRIC ALLERGY IMMUNOLOGY | Facility: CLINIC | Age: 65
End: 2025-09-08
Payer: MEDICARE

## 2025-09-08 DIAGNOSIS — J30.89 OTHER ALLERGIC RHINITIS: ICD-10-CM

## 2025-09-08 PROCEDURE — 95165 ANTIGEN THERAPY SERVICES: CPT

## 2025-09-08 PROCEDURE — 95117 IMMUNOTHERAPY INJECTIONS: CPT
